# Patient Record
Sex: FEMALE | Race: WHITE | NOT HISPANIC OR LATINO
[De-identification: names, ages, dates, MRNs, and addresses within clinical notes are randomized per-mention and may not be internally consistent; named-entity substitution may affect disease eponyms.]

---

## 2023-03-11 ENCOUNTER — APPOINTMENT (OUTPATIENT)
Dept: ORTHOPEDIC SURGERY | Facility: CLINIC | Age: 73
End: 2023-03-11
Payer: MEDICARE

## 2023-03-11 VITALS — HEIGHT: 64 IN | BODY MASS INDEX: 33.29 KG/M2 | WEIGHT: 195 LBS

## 2023-03-11 DIAGNOSIS — Z87.39 PERSONAL HISTORY OF OTHER DISEASES OF THE MUSCULOSKELETAL SYSTEM AND CONNECTIVE TISSUE: ICD-10-CM

## 2023-03-11 DIAGNOSIS — E11.9 TYPE 2 DIABETES MELLITUS W/OUT COMPLICATIONS: ICD-10-CM

## 2023-03-11 DIAGNOSIS — E78.00 PURE HYPERCHOLESTEROLEMIA, UNSPECIFIED: ICD-10-CM

## 2023-03-11 DIAGNOSIS — M10.071 IDIOPATHIC GOUT, RIGHT ANKLE AND FOOT: ICD-10-CM

## 2023-03-11 DIAGNOSIS — I10 ESSENTIAL (PRIMARY) HYPERTENSION: ICD-10-CM

## 2023-03-11 DIAGNOSIS — Z87.19 PERSONAL HISTORY OF OTHER DISEASES OF THE DIGESTIVE SYSTEM: ICD-10-CM

## 2023-03-11 PROBLEM — Z00.00 ENCOUNTER FOR PREVENTIVE HEALTH EXAMINATION: Status: ACTIVE | Noted: 2023-03-11

## 2023-03-11 PROCEDURE — 73610 X-RAY EXAM OF ANKLE: CPT | Mod: RT

## 2023-03-11 PROCEDURE — 99204 OFFICE O/P NEW MOD 45 MIN: CPT

## 2023-03-11 PROCEDURE — L3260: CPT | Mod: RT,KX

## 2023-03-11 RX ORDER — METHYLPREDNISOLONE 4 MG/1
4 TABLET ORAL
Qty: 1 | Refills: 0 | Status: ACTIVE | COMMUNITY
Start: 2023-03-11 | End: 1900-01-01

## 2023-03-11 NOTE — IMAGING
[de-identified] : PE R foot: +swelling to lateral aspect of foot, +warmth, +tenderness over dorsum lateral midfoot, +mild erythema, mild tenderness over erythema not exquisite, no tenderness at lisfranc area, no tenderness at ankle, EHL/FHL/ADF/APF intact, sensory intact, calves soft, NT\par  [Right] : right ankle [There are no fractures, subluxations or dislocations. No significant abnormalities are seen] : There are no fractures, subluxations or dislocations. No significant abnormalities are seen [Degenerative change] : Degenerative change

## 2023-03-11 NOTE — ASSESSMENT
[FreeTextEntry1] : A/P R foot gout flare\par - MDP\par - HSS\par - WBAT\par - elevation\par - discussed cellulitis, low suspicion at this time\par - possible increase of blood sugar with MDP\par - if worsening signs of infection or fevers/chills, go to ED

## 2023-03-11 NOTE — HISTORY OF PRESENT ILLNESS
[10] : 10 [Localized] : localized [Sharp] : sharp [Intermittent] : intermittent [Standing] : standing [Walking] : walking [Stairs] : stairs [Retired] : Work status: retired [de-identified] : 71 y/o F with atraumatic R foot pain x 2 days. denies any injury. denies new numbness/tingling (baseline neuropathy).\par h/o gout big toe\par +DM\par  [] : Post Surgical Visit: no [FreeTextEntry1] : Rt ankle [FreeTextEntry3] : 3/9/23 [FreeTextEntry5] : Patient woke up on 3/9/23 with ankle pain, no injury\par H/O broken toe

## 2023-04-19 ENCOUNTER — APPOINTMENT (OUTPATIENT)
Dept: HEPATOLOGY | Facility: CLINIC | Age: 73
End: 2023-04-19
Payer: MEDICARE

## 2023-04-19 DIAGNOSIS — K76.0 FATTY (CHANGE OF) LIVER, NOT ELSEWHERE CLASSIFIED: ICD-10-CM

## 2023-04-19 PROCEDURE — 76981 USE PARENCHYMA: CPT

## 2023-04-21 PROBLEM — K76.0 FATTY (CHANGE OF) LIVER, NOT ELSEWHERE CLASSIFIED: Status: ACTIVE | Noted: 2023-04-21

## 2023-09-05 ENCOUNTER — APPOINTMENT (OUTPATIENT)
Dept: ORTHOPEDIC SURGERY | Facility: CLINIC | Age: 73
End: 2023-09-05
Payer: MEDICARE

## 2023-09-05 DIAGNOSIS — M19.071 PRIMARY OSTEOARTHRITIS, RIGHT ANKLE AND FOOT: ICD-10-CM

## 2023-09-05 PROCEDURE — 73630 X-RAY EXAM OF FOOT: CPT | Mod: 50

## 2023-09-05 PROCEDURE — 99214 OFFICE O/P EST MOD 30 MIN: CPT

## 2023-09-05 RX ORDER — GABAPENTIN 300 MG/1
300 CAPSULE ORAL
Qty: 30 | Refills: 2 | Status: ACTIVE | COMMUNITY
Start: 2023-09-05 | End: 1900-01-01

## 2023-09-05 NOTE — IMAGING
[Bilateral] : foot bilaterally [Weight -] : weightbearing [de-identified] : MEDIAL 1 MTP CALCIFICATION, midfoot djd

## 2023-09-05 NOTE — HISTORY OF PRESENT ILLNESS
[de-identified] : 9/5/23: ERICH JIANG a 72 year old female here for evaluation of b/l foot/ b/l legs. Right Dorsal foot mass since 2022, has seen a podiatrist in the past for this. Difficulty with shoewear. Left foot, chronic pain for years, pain localized all arounf the foot.  Burning pain that keeps her up at night. Pmhx: DM; HGA1C High 7's, neuropathy, Osteopenia, gout [FreeTextEntry1] : Right Foot

## 2023-09-05 NOTE — PHYSICAL EXAM
[Right] : right foot and ankle [2nd] : 2nd [3rd] : 3rd [1+] : dorsalis pedis pulse: 1+ [Decreased] : saphenous nerve sensation decreased [] : no achilles tendon insertion tenderness [FreeTextEntry8] : dorsal foot swelling

## 2023-09-06 ENCOUNTER — APPOINTMENT (OUTPATIENT)
Dept: NEUROLOGY | Facility: CLINIC | Age: 73
End: 2023-09-06
Payer: MEDICARE

## 2023-09-06 PROCEDURE — 95886 MUSC TEST DONE W/N TEST COMP: CPT

## 2023-09-06 PROCEDURE — 95912 NRV CNDJ TEST 11-12 STUDIES: CPT

## 2023-09-19 ENCOUNTER — APPOINTMENT (OUTPATIENT)
Dept: ORTHOPEDIC SURGERY | Facility: CLINIC | Age: 73
End: 2023-09-19
Payer: MEDICARE

## 2023-09-19 DIAGNOSIS — E11.42 TYPE 2 DIABETES MELLITUS WITH DIABETIC POLYNEUROPATHY: ICD-10-CM

## 2023-09-19 PROCEDURE — 99214 OFFICE O/P EST MOD 30 MIN: CPT

## 2023-09-19 RX ORDER — GABAPENTIN 300 MG/1
300 CAPSULE ORAL
Qty: 90 | Refills: 0 | Status: ACTIVE | COMMUNITY
Start: 2023-09-19 | End: 1900-01-01

## 2023-09-23 ENCOUNTER — APPOINTMENT (OUTPATIENT)
Dept: ORTHOPEDIC SURGERY | Facility: CLINIC | Age: 73
End: 2023-09-23
Payer: MEDICARE

## 2023-09-23 VITALS — WEIGHT: 195 LBS | HEIGHT: 64 IN | BODY MASS INDEX: 33.29 KG/M2

## 2023-09-23 DIAGNOSIS — Z78.9 OTHER SPECIFIED HEALTH STATUS: ICD-10-CM

## 2023-09-23 DIAGNOSIS — M19.072 PRIMARY OSTEOARTHRITIS, LEFT ANKLE AND FOOT: ICD-10-CM

## 2023-09-23 DIAGNOSIS — M79.672 PAIN IN LEFT FOOT: ICD-10-CM

## 2023-09-23 PROCEDURE — 73630 X-RAY EXAM OF FOOT: CPT | Mod: LT

## 2023-09-23 PROCEDURE — 99203 OFFICE O/P NEW LOW 30 MIN: CPT

## 2024-10-10 ENCOUNTER — APPOINTMENT (OUTPATIENT)
Dept: UROLOGY | Facility: CLINIC | Age: 74
End: 2024-10-10